# Patient Record
Sex: MALE | Race: WHITE | URBAN - METROPOLITAN AREA
[De-identification: names, ages, dates, MRNs, and addresses within clinical notes are randomized per-mention and may not be internally consistent; named-entity substitution may affect disease eponyms.]

---

## 2020-07-25 ENCOUNTER — TELEPHONE ENCOUNTER (OUTPATIENT)
Dept: URBAN - METROPOLITAN AREA CLINIC 13 | Facility: CLINIC | Age: 60
End: 2020-07-25

## 2020-07-26 ENCOUNTER — TELEPHONE ENCOUNTER (OUTPATIENT)
Dept: URBAN - METROPOLITAN AREA CLINIC 13 | Facility: CLINIC | Age: 60
End: 2020-07-26

## 2020-10-21 ENCOUNTER — LAB OUTSIDE AN ENCOUNTER (OUTPATIENT)
Dept: URBAN - METROPOLITAN AREA CLINIC 113 | Facility: CLINIC | Age: 60
End: 2020-10-21

## 2020-10-21 ENCOUNTER — DASHBOARD ENCOUNTERS (OUTPATIENT)
Age: 60
End: 2020-10-21

## 2020-10-21 ENCOUNTER — WEB ENCOUNTER (OUTPATIENT)
Dept: URBAN - METROPOLITAN AREA CLINIC 113 | Facility: CLINIC | Age: 60
End: 2020-10-21

## 2020-10-21 ENCOUNTER — OFFICE VISIT (OUTPATIENT)
Dept: URBAN - METROPOLITAN AREA CLINIC 113 | Facility: CLINIC | Age: 60
End: 2020-10-21
Payer: COMMERCIAL

## 2020-10-21 VITALS
HEIGHT: 73 IN | SYSTOLIC BLOOD PRESSURE: 138 MMHG | WEIGHT: 245 LBS | TEMPERATURE: 98 F | DIASTOLIC BLOOD PRESSURE: 80 MMHG | BODY MASS INDEX: 32.47 KG/M2 | HEART RATE: 57 BPM

## 2020-10-21 DIAGNOSIS — R19.4 CHANGE IN BOWEL HABIT: ICD-10-CM

## 2020-10-21 DIAGNOSIS — R14.0 BLOATING: ICD-10-CM

## 2020-10-21 PROCEDURE — 99204 OFFICE O/P NEW MOD 45 MIN: CPT | Performed by: NURSE PRACTITIONER

## 2020-10-21 PROCEDURE — G8427 DOCREV CUR MEDS BY ELIG CLIN: HCPCS | Performed by: NURSE PRACTITIONER

## 2020-10-21 PROCEDURE — G8483 FLU IMM NO ADMIN DOC REA: HCPCS | Performed by: NURSE PRACTITIONER

## 2020-10-21 PROCEDURE — G9903 PT SCRN TBCO ID AS NON USER: HCPCS | Performed by: NURSE PRACTITIONER

## 2020-10-21 RX ORDER — SERTRALINE HYDROCHLORIDE 25 MG/1
1 TABLET UNKNOWN DOSAGE TABLET, FILM COATED ORAL ONCE A DAY
Status: ACTIVE | COMMUNITY

## 2020-10-21 RX ORDER — LISINOPRIL 20 MG/1
1 TABLET UNKNOWN DOSAGE TABLET ORAL ONCE A DAY
Status: ACTIVE | COMMUNITY

## 2020-10-21 RX ORDER — POLYETHYLENE GLYCOL 3350, SODIUM CHLORIDE, SODIUM BICARBONATE AND POTASSIUM CHLORIDE 420G
AS DIRECTED KIT ORAL ONCE
Qty: 420 GRAM | Refills: 0 | OUTPATIENT
Start: 2020-10-21 | End: 2020-10-22

## 2020-10-21 RX ORDER — QUETIAPINE 400 MG/1
1 TABLET AT BEDTIME TABLET, FILM COATED ORAL ONCE A DAY
Status: ACTIVE | COMMUNITY

## 2020-10-21 NOTE — HPI-TODAY'S VISIT:
Mr. Horner is a 60-year-old male presenting for evaluation of abdominal pain.  He was recently seen in the ED at [facility] on 10/9/2020 for evaluation of diffuse lower abdominal pain refractory to Imodium and Pepto-Bismol.  A CT of the abdomen and pelvis with contrast demonstrated no acute findings.  Additionally, labs were unremarkable for evidence of leukocytosis, anemia, metabolic imbalance, renal or liver dysfunction.  Lipase was normal at 29.  He presents today with complaints of abdominal pain, bloating, and a change in bowel habits characterized by loose stools and darkening of the stools. He describes having 4 to 5 stools per day. He reports that he has noted black colored stools as recently as yesterday. However, a digital rectal examination demonstrated brown colored stools. He has occasional heartburn. He is not currently taking any acid reducting medications. He describes feeling bloated after eating a TV dinner. He denies any NSAIDs. He reports an EGD as recently as a few months ago with Dr. Faye.

## 2020-10-27 ENCOUNTER — OFFICE VISIT (OUTPATIENT)
Dept: URBAN - METROPOLITAN AREA SURGERY CENTER 25 | Facility: SURGERY CENTER | Age: 60
End: 2020-10-27
Payer: COMMERCIAL

## 2020-10-27 ENCOUNTER — CLAIMS CREATED FROM THE CLAIM WINDOW (OUTPATIENT)
Dept: URBAN - METROPOLITAN AREA CLINIC 4 | Facility: CLINIC | Age: 60
End: 2020-10-27
Payer: COMMERCIAL

## 2020-10-27 DIAGNOSIS — D12.3 BENIGN NEOPLASM OF TRANSVERSE COLON: ICD-10-CM

## 2020-10-27 DIAGNOSIS — R19.4 ALTERED BOWEL HABITS: ICD-10-CM

## 2020-10-27 DIAGNOSIS — K63.89 OTHER SPECIFIED DISEASES OF INTESTINE: ICD-10-CM

## 2020-10-27 DIAGNOSIS — D12.3 ADENOMA OF TRANSVERSE COLON: ICD-10-CM

## 2020-10-27 DIAGNOSIS — R10.84 ABDOMINAL CRAMPING, GENERALIZED: ICD-10-CM

## 2020-10-27 PROCEDURE — 88313 SPECIAL STAINS GROUP 2: CPT | Performed by: PATHOLOGY

## 2020-10-27 PROCEDURE — 88342 IMHCHEM/IMCYTCHM 1ST ANTB: CPT | Performed by: PATHOLOGY

## 2020-10-27 PROCEDURE — 45380 COLONOSCOPY AND BIOPSY: CPT | Performed by: INTERNAL MEDICINE

## 2020-10-27 PROCEDURE — 88305 TISSUE EXAM BY PATHOLOGIST: CPT | Performed by: PATHOLOGY

## 2020-10-27 PROCEDURE — G9937 DIG OR SURV COLSCO: HCPCS | Performed by: INTERNAL MEDICINE

## 2020-10-27 PROCEDURE — 45385 COLONOSCOPY W/LESION REMOVAL: CPT | Performed by: INTERNAL MEDICINE

## 2020-10-27 PROCEDURE — G8907 PT DOC NO EVENTS ON DISCHARG: HCPCS | Performed by: INTERNAL MEDICINE

## 2020-10-27 RX ORDER — QUETIAPINE 400 MG/1
1 TABLET AT BEDTIME TABLET, FILM COATED ORAL ONCE A DAY
Status: ACTIVE | COMMUNITY

## 2020-10-27 RX ORDER — LISINOPRIL 20 MG/1
1 TABLET UNKNOWN DOSAGE TABLET ORAL ONCE A DAY
Status: ACTIVE | COMMUNITY

## 2020-10-27 RX ORDER — SERTRALINE HYDROCHLORIDE 25 MG/1
1 TABLET UNKNOWN DOSAGE TABLET, FILM COATED ORAL ONCE A DAY
Status: ACTIVE | COMMUNITY

## 2020-10-30 ENCOUNTER — TELEPHONE ENCOUNTER (OUTPATIENT)
Dept: URBAN - METROPOLITAN AREA CLINIC 113 | Facility: CLINIC | Age: 60
End: 2020-10-30

## 2020-10-30 RX ORDER — HYOSCYAMINE SULFATE 0.12 MG/1
1 TABLET UNDER THE TONGUE AND ALLOW TO DISSOLVE  AS NEEDED TABLET, ORALLY DISINTEGRATING ORAL
Qty: 60 | Refills: 3 | OUTPATIENT

## 2020-11-12 ENCOUNTER — TELEPHONE ENCOUNTER (OUTPATIENT)
Dept: URBAN - METROPOLITAN AREA CLINIC 113 | Facility: CLINIC | Age: 60
End: 2020-11-12

## 2020-11-16 ENCOUNTER — OFFICE VISIT (OUTPATIENT)
Dept: URBAN - METROPOLITAN AREA CLINIC 113 | Facility: CLINIC | Age: 60
End: 2020-11-16

## 2020-11-19 ENCOUNTER — OFFICE VISIT (OUTPATIENT)
Dept: URBAN - METROPOLITAN AREA CLINIC 113 | Facility: CLINIC | Age: 60
End: 2020-11-19

## 2020-11-19 NOTE — HPI-TODAY'S VISIT:
60-year-old male presenting for follow-up after colonoscopy. He was last seen in the office on 10/21/2020 regarding abdominal pain, bloating and change in bowel habit with subjective melena.  There was no melena noted on rectal examination at that time.  Symptoms were suspected to be related to functional bowel disorder.  He was due for colonoscopy for screening purposes which was planned at his convenience to also assess for abdominal pain and change in bowel habit. Colonoscopy was performed 10/27/2020 which was notable for adequate bowel preparation with [place] bowel preparation scale score of 5.  Diverticulosis in the sigmoid and descending colon.  Removal of a 10 mm polyp from the transverse colon and 10 mm polyp from the sigmoid colon normal mucosa throughout the entire examined colon status post random biopsies.  Sigmoid colon polypectomy was hyperplastic tissue.  Transverse colon polypectomy revealed tubular adenoma.  Random colon biopsies revealed no significant abnormality.  He was recommended to repeat colonoscopy in 3 years as part of colon cancer prevention.

## 2024-11-14 ENCOUNTER — LAB OUTSIDE AN ENCOUNTER (OUTPATIENT)
Dept: URBAN - METROPOLITAN AREA CLINIC 113 | Facility: CLINIC | Age: 64
End: 2024-11-14

## 2024-11-14 ENCOUNTER — OFFICE VISIT (OUTPATIENT)
Dept: URBAN - METROPOLITAN AREA CLINIC 113 | Facility: CLINIC | Age: 64
End: 2024-11-14
Payer: COMMERCIAL

## 2024-11-14 VITALS
SYSTOLIC BLOOD PRESSURE: 121 MMHG | HEART RATE: 63 BPM | BODY MASS INDEX: 31.17 KG/M2 | DIASTOLIC BLOOD PRESSURE: 71 MMHG | WEIGHT: 235.2 LBS | TEMPERATURE: 97.7 F | RESPIRATION RATE: 12 BRPM | HEIGHT: 73 IN

## 2024-11-14 DIAGNOSIS — R74.8 ELEVATED LIPASE: ICD-10-CM

## 2024-11-14 DIAGNOSIS — Z86.0101 HISTORY OF ADENOMATOUS POLYP OF COLON: ICD-10-CM

## 2024-11-14 DIAGNOSIS — R10.13 EPIGASTRIC ABDOMINAL PAIN: ICD-10-CM

## 2024-11-14 PROCEDURE — 99204 OFFICE O/P NEW MOD 45 MIN: CPT | Performed by: NURSE PRACTITIONER

## 2024-11-14 RX ORDER — QUETIAPINE 400 MG/1
1 TABLET AT BEDTIME TABLET, FILM COATED ORAL ONCE A DAY
Status: ACTIVE | COMMUNITY

## 2024-11-14 RX ORDER — SERTRALINE HYDROCHLORIDE 25 MG/1
1 TABLET UNKNOWN DOSAGE TABLET, FILM COATED ORAL ONCE A DAY
Status: ACTIVE | COMMUNITY

## 2024-11-14 RX ORDER — PANTOPRAZOLE SODIUM 40 MG/1
1 TABLET TABLET, DELAYED RELEASE ORAL ONCE A DAY
Qty: 90 TABLET | Refills: 3 | OUTPATIENT
Start: 2024-11-14

## 2024-11-14 RX ORDER — SODIUM, POTASSIUM,MAG SULFATES 17.5-3.13G
354 ML SOLUTION, RECONSTITUTED, ORAL ORAL ONCE
Qty: 354 MILLILITER | Refills: 0 | OUTPATIENT
Start: 2024-11-14 | End: 2024-11-15

## 2024-11-14 RX ORDER — LISINOPRIL 20 MG/1
1 TABLET UNKNOWN DOSAGE TABLET ORAL ONCE A DAY
Status: ACTIVE | COMMUNITY

## 2024-11-14 RX ORDER — HYOSCYAMINE SULFATE 0.12 MG/1
1 TABLET UNDER THE TONGUE AND ALLOW TO DISSOLVE  AS NEEDED TABLET, ORALLY DISINTEGRATING ORAL
Qty: 60 | Refills: 3 | Status: ACTIVE | COMMUNITY

## 2024-11-14 NOTE — HPI-TODAY'S VISIT:
63 yo male presenting for evaluation of abdominal pain, nausea and vomiting. He has had two ER visits in the last 12 weeks for periumbilical abdominal pain, nausea, vomiting and diarrhea. ER visit 9/13/24 was notable for CT imaging suggesting nonspecific enteritis. No acute GI findings. CMP and lipase were unremarkable. CBC showed a mildly low hemoglobin at 13. ED visit on 10/27 for abdominal pain was notable for an unremarkable CT a/p with contrast and normal labs with exception to lipase 894.   He was discharged with pain medication and nausea medication. He is no longer having pain. His appetite is improved and he has advanced to a regular diet. No nausea or vomiting. No melena or red blood per rectum. Weight is stable.  Colonoscopy 10/27/20 was notable for an adequate bowel preparation, diverticulosis in the sigmoid and descending colon and removal of two 10 mm polyps from the transverse colon and sigmoid colon. Pathology showed adenomatous tissue; he is due for repeat colonoscopy for surveillance purposes.  He denies any history of lung disease, need for supplemental oxygen. He denies any history of cardiac disease, stent placement, need for blood thinning medications, history of pacemaker or defibrillator. There is no history of chronic kidney disease or need for dialysis. No history of neurologic disease or seizure disorder. He is not taking any medication for diabetes or weight loss, including GLP-1 agonists, SGLT-2 or phentermine.

## 2024-11-20 ENCOUNTER — TELEPHONE ENCOUNTER (OUTPATIENT)
Dept: URBAN - METROPOLITAN AREA CLINIC 113 | Facility: CLINIC | Age: 64
End: 2024-11-20

## 2024-11-27 LAB
LIPASE: 26
TRIGLYCERIDES: 139

## 2025-01-14 ENCOUNTER — CLAIMS CREATED FROM THE CLAIM WINDOW (OUTPATIENT)
Dept: URBAN - METROPOLITAN AREA SURGERY CENTER 25 | Facility: SURGERY CENTER | Age: 65
End: 2025-01-14
Payer: COMMERCIAL

## 2025-01-14 ENCOUNTER — CLAIMS CREATED FROM THE CLAIM WINDOW (OUTPATIENT)
Dept: URBAN - METROPOLITAN AREA CLINIC 4 | Facility: CLINIC | Age: 65
End: 2025-01-14
Payer: COMMERCIAL

## 2025-01-14 DIAGNOSIS — D12.0 ADENOMA OF CECUM: ICD-10-CM

## 2025-01-14 DIAGNOSIS — Z09 ENCNTR FOR F/U EXAM AFT TRTMT FOR COND OTH THAN MALIG NEOPLM: ICD-10-CM

## 2025-01-14 DIAGNOSIS — D12.3 ADENOMA OF TRANSVERSE COLON: ICD-10-CM

## 2025-01-14 DIAGNOSIS — D12.0 BENIGN NEOPLASM OF CECUM: ICD-10-CM

## 2025-01-14 DIAGNOSIS — K63.5 BENIGN COLON POLYPS: ICD-10-CM

## 2025-01-14 DIAGNOSIS — D12.2 BENIGN NEOPLASM OF ASCENDING COLON: ICD-10-CM

## 2025-01-14 DIAGNOSIS — D12.2 ADENOMA OF ASCENDING COLON: ICD-10-CM

## 2025-01-14 DIAGNOSIS — K63.5 POLYP OF COLON: ICD-10-CM

## 2025-01-14 DIAGNOSIS — Z86.0101 H/O ADENOMATOUS POLYP OF COLON: ICD-10-CM

## 2025-01-14 DIAGNOSIS — Z86.0101 PERSONAL HISTORY OF ADENOMATOUS AND SERRATED COLON POLYPS: ICD-10-CM

## 2025-01-14 DIAGNOSIS — Z86.0100 PERSONAL HISTORY OF COLONIC POLYPS: ICD-10-CM

## 2025-01-14 DIAGNOSIS — K57.30 COLON, DIVERTICULOSIS: ICD-10-CM

## 2025-01-14 DIAGNOSIS — D12.3 BENIGN NEOPLASM OF TRANSVERSE COLON: ICD-10-CM

## 2025-01-14 PROCEDURE — 45385 COLONOSCOPY W/LESION REMOVAL: CPT | Performed by: INTERNAL MEDICINE

## 2025-01-14 PROCEDURE — 0529F INTRVL 3/>YR PTS CLNSCP DOCD: CPT | Performed by: INTERNAL MEDICINE

## 2025-01-14 PROCEDURE — 00811 ANES LWR INTST NDSC NOS: CPT | Performed by: NURSE ANESTHETIST, CERTIFIED REGISTERED

## 2025-01-14 PROCEDURE — 88305 TISSUE EXAM BY PATHOLOGIST: CPT | Performed by: PATHOLOGY

## 2025-01-14 RX ORDER — QUETIAPINE 400 MG/1
1 TABLET AT BEDTIME TABLET, FILM COATED ORAL ONCE A DAY
Status: ACTIVE | COMMUNITY

## 2025-01-14 RX ORDER — SERTRALINE HYDROCHLORIDE 25 MG/1
1 TABLET UNKNOWN DOSAGE TABLET, FILM COATED ORAL ONCE A DAY
Status: ACTIVE | COMMUNITY

## 2025-01-14 RX ORDER — HYOSCYAMINE SULFATE 0.12 MG/1
1 TABLET UNDER THE TONGUE AND ALLOW TO DISSOLVE  AS NEEDED TABLET, ORALLY DISINTEGRATING ORAL
Qty: 60 | Refills: 3 | Status: ACTIVE | COMMUNITY

## 2025-01-14 RX ORDER — PANTOPRAZOLE SODIUM 40 MG/1
1 TABLET TABLET, DELAYED RELEASE ORAL ONCE A DAY
Qty: 90 TABLET | Refills: 3 | Status: ACTIVE | COMMUNITY
Start: 2024-11-14

## 2025-01-14 RX ORDER — LISINOPRIL 20 MG/1
1 TABLET UNKNOWN DOSAGE TABLET ORAL ONCE A DAY
Status: ACTIVE | COMMUNITY

## 2025-02-12 ENCOUNTER — OFFICE VISIT (OUTPATIENT)
Dept: URBAN - METROPOLITAN AREA CLINIC 113 | Facility: CLINIC | Age: 65
End: 2025-02-12

## 2025-02-12 RX ORDER — SERTRALINE HYDROCHLORIDE 25 MG/1
1 TABLET UNKNOWN DOSAGE TABLET, FILM COATED ORAL ONCE A DAY
Status: ACTIVE | COMMUNITY

## 2025-02-12 RX ORDER — PANTOPRAZOLE SODIUM 40 MG/1
1 TABLET TABLET, DELAYED RELEASE ORAL ONCE A DAY
Qty: 90 TABLET | Refills: 3 | Status: ACTIVE | COMMUNITY
Start: 2024-11-14

## 2025-02-12 RX ORDER — QUETIAPINE 400 MG/1
1 TABLET AT BEDTIME TABLET, FILM COATED ORAL ONCE A DAY
Status: ACTIVE | COMMUNITY

## 2025-02-12 RX ORDER — LISINOPRIL 20 MG/1
1 TABLET UNKNOWN DOSAGE TABLET ORAL ONCE A DAY
Status: ACTIVE | COMMUNITY

## 2025-02-12 RX ORDER — HYOSCYAMINE SULFATE 0.12 MG/1
1 TABLET UNDER THE TONGUE AND ALLOW TO DISSOLVE  AS NEEDED TABLET, ORALLY DISINTEGRATING ORAL
Qty: 60 | Refills: 3 | Status: ACTIVE | COMMUNITY

## 2025-02-12 NOTE — HPI-TODAY'S VISIT:
63 yo male with a history of adenomatous colon polyps, presenting for follow up after a colonoscopy.  Colonoscopy 1/14/25 was notable for a good bowel preparation, normal TI, nonbleeding internal hemorrhoids, removal of three small polpys from the cecum and ascending colon, three small polyps from the transverse colon, and two small polyps from the descening colon. Pathology revealed adenomatous and hyperplastic tissue. A repeat colonoscopy is recommended in 3 years.

## 2025-02-28 ENCOUNTER — OFFICE VISIT (OUTPATIENT)
Dept: URBAN - METROPOLITAN AREA CLINIC 113 | Facility: CLINIC | Age: 65
End: 2025-02-28

## 2025-02-28 RX ORDER — QUETIAPINE 400 MG/1
1 TABLET AT BEDTIME TABLET, FILM COATED ORAL ONCE A DAY
Status: ACTIVE | COMMUNITY

## 2025-02-28 RX ORDER — SERTRALINE HYDROCHLORIDE 25 MG/1
1 TABLET UNKNOWN DOSAGE TABLET, FILM COATED ORAL ONCE A DAY
Status: ACTIVE | COMMUNITY

## 2025-02-28 RX ORDER — LISINOPRIL 20 MG/1
1 TABLET UNKNOWN DOSAGE TABLET ORAL ONCE A DAY
Status: ACTIVE | COMMUNITY

## 2025-02-28 RX ORDER — PANTOPRAZOLE SODIUM 40 MG/1
1 TABLET TABLET, DELAYED RELEASE ORAL ONCE A DAY
Qty: 90 TABLET | Refills: 3 | Status: ACTIVE | COMMUNITY
Start: 2024-11-14

## 2025-02-28 RX ORDER — HYOSCYAMINE SULFATE 0.12 MG/1
1 TABLET UNDER THE TONGUE AND ALLOW TO DISSOLVE  AS NEEDED TABLET, ORALLY DISINTEGRATING ORAL
Qty: 60 | Refills: 3 | Status: ACTIVE | COMMUNITY

## 2025-02-28 NOTE — HPI-TODAY'S VISIT:
65 yo male with a history of adenomatous colon polyps, presenting for follow up after a colonoscopy.  Colonoscopy 1/14/25 was notable for a good bowel preparation, normal TI, nonbleeding internal hemorrhoids, removal of three small polpys from the cecum and ascending colon, three small polyps from the transverse colon, and two small polyps from the descening colon. Pathology revealed adenomatous and hyperplastic tissue. A repeat colonoscopy is recommended in 3 years.